# Patient Record
Sex: FEMALE | Race: WHITE | NOT HISPANIC OR LATINO | URBAN - METROPOLITAN AREA
[De-identification: names, ages, dates, MRNs, and addresses within clinical notes are randomized per-mention and may not be internally consistent; named-entity substitution may affect disease eponyms.]

---

## 2020-07-13 ENCOUNTER — APPOINTMENT (RX ONLY)
Dept: URBAN - METROPOLITAN AREA CLINIC 91 | Facility: CLINIC | Age: 30
Setting detail: DERMATOLOGY
End: 2020-07-13

## 2020-07-13 DIAGNOSIS — B36.0 PITYRIASIS VERSICOLOR: ICD-10-CM | Status: INADEQUATELY CONTROLLED

## 2020-07-13 PROCEDURE — 99202 OFFICE O/P NEW SF 15 MIN: CPT | Mod: 95

## 2020-07-13 PROCEDURE — ? PRESCRIPTION

## 2020-07-13 PROCEDURE — ? DIAGNOSIS COMMENT

## 2020-07-13 PROCEDURE — ? ADDITIONAL NOTES

## 2020-07-13 PROCEDURE — ? COUNSELING

## 2020-07-13 RX ORDER — KETOCONAZOLE 20 MG/G
CREAM TOPICAL BID
Qty: 1 | Refills: 1 | Status: ERX | COMMUNITY
Start: 2020-07-13

## 2020-07-13 RX ORDER — FLUCONAZOLE 100 MG/1
TABLET ORAL
Qty: 6 | Refills: 0 | Status: ERX | COMMUNITY
Start: 2020-07-13

## 2020-07-13 RX ADMIN — FLUCONAZOLE: 100 TABLET ORAL at 00:00

## 2020-07-13 RX ADMIN — KETOCONAZOLE: 20 CREAM TOPICAL at 00:00

## 2020-07-13 NOTE — PROCEDURE: DIAGNOSIS COMMENT
Comment: Paula states that she gets this about once a year.  denies pregnancy or breast feeding
Detail Level: Simple

## 2020-07-14 ENCOUNTER — RX ONLY (OUTPATIENT)
Age: 30
Setting detail: RX ONLY
End: 2020-07-14

## 2020-07-14 RX ORDER — FLUCONAZOLE 100 MG/1
TABLET ORAL
Qty: 9 | Refills: 0 | Status: ERX | COMMUNITY
Start: 2020-07-14

## 2024-05-07 ENCOUNTER — APPOINTMENT (RX ONLY)
Dept: URBAN - METROPOLITAN AREA CLINIC 91 | Facility: CLINIC | Age: 34
Setting detail: DERMATOLOGY
End: 2024-05-07

## 2024-05-07 DIAGNOSIS — L71.0 PERIORAL DERMATITIS: ICD-10-CM

## 2024-05-07 DIAGNOSIS — L81.4 OTHER MELANIN HYPERPIGMENTATION: ICD-10-CM

## 2024-05-07 DIAGNOSIS — D22 MELANOCYTIC NEVI: ICD-10-CM

## 2024-05-07 PROBLEM — D22.5 MELANOCYTIC NEVI OF TRUNK: Status: ACTIVE | Noted: 2024-05-07

## 2024-05-07 PROBLEM — D23.72 OTHER BENIGN NEOPLASM OF SKIN OF LEFT LOWER LIMB, INCLUDING HIP: Status: ACTIVE | Noted: 2024-05-07

## 2024-05-07 PROCEDURE — ? PRESCRIPTION MEDICATION MANAGEMENT

## 2024-05-07 PROCEDURE — ? PRESCRIPTION

## 2024-05-07 PROCEDURE — 99203 OFFICE O/P NEW LOW 30 MIN: CPT

## 2024-05-07 PROCEDURE — ? TREATMENT REGIMEN

## 2024-05-07 PROCEDURE — ? FULL BODY SKIN EXAM

## 2024-05-07 PROCEDURE — ? PRODUCT LINE (ELTA MD)

## 2024-05-07 PROCEDURE — ? COUNSELING

## 2024-05-07 RX ORDER — METRONIDAZOLE 7.5 MG/G
1 CREAM TOPICAL BID
Qty: 45 | Refills: 2 | Status: ERX | COMMUNITY
Start: 2024-05-07

## 2024-05-07 RX ADMIN — METRONIDAZOLE 1: 7.5 CREAM TOPICAL at 00:00

## 2024-05-07 ASSESSMENT — LOCATION DETAILED DESCRIPTION DERM
LOCATION DETAILED: RIGHT UPPER CUTANEOUS LIP
LOCATION DETAILED: LEFT UPPER CUTANEOUS LIP
LOCATION DETAILED: UPPER STERNUM
LOCATION DETAILED: RIGHT UPPER CUTANEOUS LIP
LOCATION DETAILED: LEFT SUPERIOR UPPER BACK

## 2024-05-07 ASSESSMENT — LOCATION SIMPLE DESCRIPTION DERM
LOCATION SIMPLE: RIGHT LIP
LOCATION SIMPLE: RIGHT LIP
LOCATION SIMPLE: LEFT LIP
LOCATION SIMPLE: LEFT UPPER BACK
LOCATION SIMPLE: CHEST

## 2024-05-07 ASSESSMENT — LOCATION ZONE DERM
LOCATION ZONE: TRUNK
LOCATION ZONE: LIP
LOCATION ZONE: LIP

## 2024-05-07 NOTE — PROCEDURE: PRODUCT LINE (ELTA MD)
Product 34 Units: 0
Product 10 Price (In Dollars - Numeric Only, No Special Characters Or $): 0.00
Name Of Product 1: Elta MD UV Elements (tinted)
Product 1 Application Directions: Apply to face & neck every AM and reapply every 2 hours and after water activities
Risk Of Complication Category: No MDM
Allow Plan To Count Towards E/M Coding: Yes
Detail Level: Detailed
Send Charges To Patient Encounter: No
Assigning Risk Information: Per AMA, level of risk is based upon consequences of the problem(s) addressed at the encounter when appropriately treated. Risk also includes medical decision making related to the need to initiate or forego further testing, treatment and/or hospitalization. Over the counter medication are assigned a risk level of low. Prescription medication management is assigned a risk level of moderate.

## 2025-05-07 ENCOUNTER — APPOINTMENT (OUTPATIENT)
Dept: URBAN - METROPOLITAN AREA CLINIC 91 | Facility: CLINIC | Age: 35
Setting detail: DERMATOLOGY
End: 2025-05-07

## 2025-05-07 DIAGNOSIS — L21.8 OTHER SEBORRHEIC DERMATITIS: ICD-10-CM

## 2025-05-07 DIAGNOSIS — D22 MELANOCYTIC NEVI: ICD-10-CM

## 2025-05-07 DIAGNOSIS — L81.4 OTHER MELANIN HYPERPIGMENTATION: ICD-10-CM

## 2025-05-07 PROBLEM — D23.72 OTHER BENIGN NEOPLASM OF SKIN OF LEFT LOWER LIMB, INCLUDING HIP: Status: ACTIVE | Noted: 2025-05-07

## 2025-05-07 PROBLEM — D22.5 MELANOCYTIC NEVI OF TRUNK: Status: ACTIVE | Noted: 2025-05-07

## 2025-05-07 PROCEDURE — ? PRESCRIPTION

## 2025-05-07 PROCEDURE — ? PRODUCT LINE (ELTA MD)

## 2025-05-07 PROCEDURE — ? PRESCRIPTION MEDICATION MANAGEMENT

## 2025-05-07 PROCEDURE — ? TREATMENT REGIMEN

## 2025-05-07 PROCEDURE — 99213 OFFICE O/P EST LOW 20 MIN: CPT

## 2025-05-07 PROCEDURE — ? ADDITIONAL NOTES

## 2025-05-07 PROCEDURE — ? FULL BODY SKIN EXAM

## 2025-05-07 PROCEDURE — ? COUNSELING

## 2025-05-07 RX ORDER — CLOBETASOL PROPIONATE 0.5 MG/ML
SOLUTION TOPICAL BID
Qty: 50 | Refills: 3 | Status: ERX | COMMUNITY
Start: 2025-05-07

## 2025-05-07 RX ADMIN — CLOBETASOL PROPIONATE: 0.5 SOLUTION TOPICAL at 00:00

## 2025-05-07 ASSESSMENT — LOCATION SIMPLE DESCRIPTION DERM
LOCATION SIMPLE: SCALP
LOCATION SIMPLE: CHEST
LOCATION SIMPLE: LEFT UPPER BACK

## 2025-05-07 ASSESSMENT — LOCATION DETAILED DESCRIPTION DERM
LOCATION DETAILED: UPPER STERNUM
LOCATION DETAILED: LEFT SUPERIOR UPPER BACK
LOCATION DETAILED: LEFT SUPERIOR PARIETAL SCALP

## 2025-05-07 ASSESSMENT — LOCATION ZONE DERM
LOCATION ZONE: SCALP
LOCATION ZONE: TRUNK

## 2025-05-07 NOTE — HPI: EVALUATION OF SKIN LESION(S)
What Type Of Note Output Would You Prefer (Optional)?: Standard Output
Hpi Title: Evaluation of Skin Lesions
Family Member: Mother
Additional History: Concern spot on the left upper and lower left.

## 2025-05-07 NOTE — PROCEDURE: PRESCRIPTION MEDICATION MANAGEMENT
Initiate Treatment: clobetasol 0.05 % scalp solution Bid\\nQuantity: 50.0 ml  Days Supply: 30\\nSig: Apply topically to scalp BID x 2 weeks PRN flare
Detail Level: Zone
Render In Strict Bullet Format?: No

## 2025-05-07 NOTE — PROCEDURE: ADDITIONAL NOTES
Detail Level: Simple
Render Risk Assessment In Note?: no
Additional Notes: Patient breastfeeding! Advised to not start until she is completely done!